# Patient Record
Sex: MALE
[De-identification: names, ages, dates, MRNs, and addresses within clinical notes are randomized per-mention and may not be internally consistent; named-entity substitution may affect disease eponyms.]

---

## 2022-03-16 ENCOUNTER — TRANSCRIPTION ENCOUNTER (OUTPATIENT)
Age: 42
End: 2022-03-16

## 2023-04-25 PROBLEM — Z00.00 ENCOUNTER FOR PREVENTIVE HEALTH EXAMINATION: Status: ACTIVE | Noted: 2023-04-25

## 2023-04-26 ENCOUNTER — APPOINTMENT (OUTPATIENT)
Dept: ORTHOPEDIC SURGERY | Facility: CLINIC | Age: 43
End: 2023-04-26
Payer: COMMERCIAL

## 2023-04-26 VITALS — HEIGHT: 70 IN | WEIGHT: 200 LBS | BODY MASS INDEX: 28.63 KG/M2

## 2023-04-26 DIAGNOSIS — M76.62 ACHILLES TENDINITIS, LEFT LEG: ICD-10-CM

## 2023-04-26 PROCEDURE — 73630 X-RAY EXAM OF FOOT: CPT | Mod: LT

## 2023-04-26 PROCEDURE — 99204 OFFICE O/P NEW MOD 45 MIN: CPT

## 2023-04-26 NOTE — IMAGING
[de-identified] : Alert oriented x3.  Is pleasant cooperative that exam.  I examined his left lower extremity.  He is tender palpation at the insertion of the Achilles tendon along.  There is a prominence present.  He is neurovascular intact distally.  The Achilles is intact.

## 2023-04-26 NOTE — DISCUSSION/SUMMARY
[Medication Risks Reviewed] : Medication risks reviewed [de-identified] : Joanne patient's findings with him.  I think at this time he would benefit from a course of physical therapy along with home stretching exercises.  I have also prescribed anti-inflammatory medication.  We discussed the risks of this medication.  I will see him back in as-needed basis.  All questions answered

## 2023-04-26 NOTE — DATA REVIEWED
[FreeTextEntry1] : 3 views of the patient's left foot ordered reviewed by me personally.  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  Adrianna's deformity present

## 2023-04-26 NOTE — HISTORY OF PRESENT ILLNESS
[de-identified] : This is a 42-year-old patient who began experiencing left heel pain over the past week.  Localized the pain to the insertion of the Achilles tendon.  No traumatic event.  Denies any fevers chills.

## 2023-06-21 ENCOUNTER — APPOINTMENT (OUTPATIENT)
Dept: ORTHOPEDIC SURGERY | Facility: CLINIC | Age: 43
End: 2023-06-21

## 2023-10-02 ENCOUNTER — RX RENEWAL (OUTPATIENT)
Age: 43
End: 2023-10-02

## 2023-10-02 RX ORDER — MELOXICAM 15 MG/1
15 TABLET ORAL DAILY
Qty: 30 | Refills: 2 | Status: ACTIVE | COMMUNITY
Start: 2023-04-26 | End: 1900-01-01

## 2024-01-10 ENCOUNTER — NON-APPOINTMENT (OUTPATIENT)
Age: 44
End: 2024-01-10